# Patient Record
Sex: MALE | Race: WHITE | URBAN - METROPOLITAN AREA
[De-identification: names, ages, dates, MRNs, and addresses within clinical notes are randomized per-mention and may not be internally consistent; named-entity substitution may affect disease eponyms.]

---

## 2017-12-02 PROBLEM — I44.2 COMPLETE HEART BLOCK (HCC): Status: ACTIVE | Noted: 2017-12-02

## 2017-12-02 PROBLEM — I45.9 STOKES-ADAMS SYNCOPE: Status: ACTIVE | Noted: 2017-12-02

## 2018-02-07 ENCOUNTER — TELEPHONE (OUTPATIENT)
Dept: CARDIOLOGY CLINIC | Age: 83
End: 2018-02-07

## 2018-02-28 ENCOUNTER — TELEPHONE (OUTPATIENT)
Dept: CARDIOLOGY CLINIC | Age: 83
End: 2018-02-28

## 2018-04-16 ENCOUNTER — TELEPHONE (OUTPATIENT)
Dept: CARDIOLOGY CLINIC | Age: 83
End: 2018-04-16

## 2018-04-30 ENCOUNTER — TELEPHONE (OUTPATIENT)
Dept: CARDIOLOGY CLINIC | Age: 83
End: 2018-04-30

## 2018-08-06 ENCOUNTER — TELEPHONE (OUTPATIENT)
Dept: CARDIOLOGY CLINIC | Age: 83
End: 2018-08-06

## 2018-11-12 ENCOUNTER — PROCEDURE VISIT (OUTPATIENT)
Dept: CARDIOLOGY CLINIC | Age: 83
End: 2018-11-12
Payer: MEDICARE

## 2018-11-12 ENCOUNTER — TELEPHONE (OUTPATIENT)
Dept: CARDIOLOGY CLINIC | Age: 83
End: 2018-11-12

## 2018-11-12 DIAGNOSIS — I49.5 SINUS NODE DYSFUNCTION (HCC): ICD-10-CM

## 2018-11-12 DIAGNOSIS — Z95.0 CARDIAC PACEMAKER IN SITU: Primary | ICD-10-CM

## 2018-11-12 PROCEDURE — 93296 REM INTERROG EVL PM/IDS: CPT | Performed by: INTERNAL MEDICINE

## 2018-11-12 PROCEDURE — 93294 REM INTERROG EVL PM/LDLS PM: CPT | Performed by: INTERNAL MEDICINE

## 2018-11-16 ENCOUNTER — TELEPHONE (OUTPATIENT)
Dept: CARDIOLOGY CLINIC | Age: 83
End: 2018-11-16

## 2018-11-16 NOTE — TELEPHONE ENCOUNTER
Left message for patient to call and discuss last pacemaker report. Patient needs appointment to discuss anticoagulation therapy.

## 2018-11-20 ENCOUNTER — TELEPHONE (OUTPATIENT)
Dept: CARDIOLOGY CLINIC | Age: 83
End: 2018-11-20

## 2018-11-20 ENCOUNTER — OFFICE VISIT (OUTPATIENT)
Dept: CARDIOLOGY CLINIC | Age: 83
End: 2018-11-20
Payer: MEDICARE

## 2018-11-20 VITALS
DIASTOLIC BLOOD PRESSURE: 90 MMHG | BODY MASS INDEX: 26.58 KG/M2 | HEART RATE: 80 BPM | HEIGHT: 63 IN | WEIGHT: 150 LBS | SYSTOLIC BLOOD PRESSURE: 156 MMHG

## 2018-11-20 DIAGNOSIS — I44.2 COMPLETE HEART BLOCK (HCC): ICD-10-CM

## 2018-11-20 DIAGNOSIS — R00.1 BRADYCARDIA: Primary | ICD-10-CM

## 2018-11-20 DIAGNOSIS — R79.89 SERUM CREATININE RAISED: ICD-10-CM

## 2018-11-20 DIAGNOSIS — I10 ESSENTIAL HYPERTENSION: ICD-10-CM

## 2018-11-20 DIAGNOSIS — E87.20 LACTIC ACID ACIDOSIS: ICD-10-CM

## 2018-11-20 DIAGNOSIS — I45.9 STOKES-ADAMS SYNCOPE: ICD-10-CM

## 2018-11-20 PROCEDURE — G8484 FLU IMMUNIZE NO ADMIN: HCPCS | Performed by: INTERNAL MEDICINE

## 2018-11-20 PROCEDURE — 99213 OFFICE O/P EST LOW 20 MIN: CPT | Performed by: INTERNAL MEDICINE

## 2018-11-20 PROCEDURE — 1101F PT FALLS ASSESS-DOCD LE1/YR: CPT | Performed by: INTERNAL MEDICINE

## 2018-11-20 PROCEDURE — 4040F PNEUMOC VAC/ADMIN/RCVD: CPT | Performed by: INTERNAL MEDICINE

## 2018-11-20 PROCEDURE — 1123F ACP DISCUSS/DSCN MKR DOCD: CPT | Performed by: INTERNAL MEDICINE

## 2018-11-20 PROCEDURE — G8419 CALC BMI OUT NRM PARAM NOF/U: HCPCS | Performed by: INTERNAL MEDICINE

## 2018-11-20 PROCEDURE — 1036F TOBACCO NON-USER: CPT | Performed by: INTERNAL MEDICINE

## 2018-11-20 PROCEDURE — G8427 DOCREV CUR MEDS BY ELIG CLIN: HCPCS | Performed by: INTERNAL MEDICINE

## 2019-02-18 ENCOUNTER — PROCEDURE VISIT (OUTPATIENT)
Dept: CARDIOLOGY CLINIC | Age: 84
End: 2019-02-18
Payer: MEDICARE

## 2019-02-18 DIAGNOSIS — Z95.0 CARDIAC PACEMAKER IN SITU: Primary | ICD-10-CM

## 2019-02-18 DIAGNOSIS — I49.5 SINUS NODE DYSFUNCTION (HCC): ICD-10-CM

## 2019-02-18 PROCEDURE — 93294 REM INTERROG EVL PM/LDLS PM: CPT | Performed by: INTERNAL MEDICINE

## 2019-02-18 PROCEDURE — 93296 REM INTERROG EVL PM/IDS: CPT | Performed by: INTERNAL MEDICINE

## 2019-05-16 ENCOUNTER — OFFICE VISIT (OUTPATIENT)
Dept: CARDIOLOGY CLINIC | Age: 84
End: 2019-05-16
Payer: MEDICARE

## 2019-05-16 VITALS
SYSTOLIC BLOOD PRESSURE: 132 MMHG | WEIGHT: 150 LBS | HEART RATE: 60 BPM | BODY MASS INDEX: 25.61 KG/M2 | HEIGHT: 64 IN | DIASTOLIC BLOOD PRESSURE: 80 MMHG

## 2019-05-16 DIAGNOSIS — Z95.0 PACEMAKER: ICD-10-CM

## 2019-05-16 DIAGNOSIS — I38 VHD (VALVULAR HEART DISEASE): ICD-10-CM

## 2019-05-16 DIAGNOSIS — Z95.2 S/P TAVR (TRANSCATHETER AORTIC VALVE REPLACEMENT): ICD-10-CM

## 2019-05-16 DIAGNOSIS — I10 ESSENTIAL HYPERTENSION: Primary | ICD-10-CM

## 2019-05-16 DIAGNOSIS — I44.2 COMPLETE HEART BLOCK (HCC): ICD-10-CM

## 2019-05-16 DIAGNOSIS — I45.9 STOKES-ADAMS SYNCOPE: ICD-10-CM

## 2019-05-16 DIAGNOSIS — R00.1 BRADYCARDIA: ICD-10-CM

## 2019-05-16 DIAGNOSIS — R55 SYNCOPE AND COLLAPSE: ICD-10-CM

## 2019-05-16 PROCEDURE — 4040F PNEUMOC VAC/ADMIN/RCVD: CPT | Performed by: INTERNAL MEDICINE

## 2019-05-16 PROCEDURE — 99214 OFFICE O/P EST MOD 30 MIN: CPT | Performed by: INTERNAL MEDICINE

## 2019-05-16 PROCEDURE — 1036F TOBACCO NON-USER: CPT | Performed by: INTERNAL MEDICINE

## 2019-05-16 PROCEDURE — G8419 CALC BMI OUT NRM PARAM NOF/U: HCPCS | Performed by: INTERNAL MEDICINE

## 2019-05-16 PROCEDURE — G8427 DOCREV CUR MEDS BY ELIG CLIN: HCPCS | Performed by: INTERNAL MEDICINE

## 2019-05-16 PROCEDURE — 1123F ACP DISCUSS/DSCN MKR DOCD: CPT | Performed by: INTERNAL MEDICINE

## 2019-05-16 RX ORDER — ATORVASTATIN CALCIUM 80 MG/1
80 TABLET, FILM COATED ORAL DAILY
COMMUNITY
End: 2019-05-20 | Stop reason: SDUPTHER

## 2019-05-16 RX ORDER — CLOPIDOGREL BISULFATE 75 MG/1
75 TABLET ORAL DAILY
COMMUNITY
End: 2019-05-20 | Stop reason: SDUPTHER

## 2019-05-16 NOTE — PROGRESS NOTES
Amos Ludwig is a 80 y.o. male who has    CHIEF COMPLAINT AS FOLLOWS:  CHEST PAIN: Patient denies any C/O chest pains at this time. SOB: No C/O SOB at this time. LEG EDEMA: No leg edema   PALPITATIONS: Denies any C/O Palpitations                            DIZZINESS: No C/O Dizziness                         SYNCOPE: None   OTHER: Patient has not been taking any of the medications                                    HPI: Patient is here for F/U on his, VHD, CHB  & HTN  problems. He does not have any complaints at this time. Ruthie Reilly has the following history recorded in care path:  Patient Active Problem List    Diagnosis Date Noted    Essential hypertension      Priority: High    Complete heart block ([address] 12/02/2017     Priority: High    Good-Beard syncope 12/02/2017     Priority: High    Syncope and collapse      Priority: High    Bradycardia      Priority: High    Lactic acid acidosis      Priority: High    Serum creatinine raised      Priority: High    Troponin level elevated      Priority: High    Pacemaker 05/16/2019    VHD (valvular heart disease) 05/16/2019    S/P TAVR (transcatheter aortic valve replacement) 05/16/2019     No current outpatient medications on file. No current facility-administered medications for this visit. Allergies: Patient has no known allergies. Past Medical History:   Diagnosis Date    Hypertension     Thyroid disease      Past Surgical History:   Procedure Laterality Date    APPENDECTOMY      CARDIAC VALVE REPLACEMENT      EYE SURGERY      cataracts      As reviewed History reviewed. No pertinent family history.   Social History     Tobacco Use    Smoking status: Never Smoker    Smokeless tobacco: Never Used   Substance Use Topics    Alcohol use: Yes     Comment: occ      Review of Systems:    Constitutional: Negative for diaphoresis and fatigue  Psychological:Negative for anxiety or depression  HENT: Negative for headaches, nasal congestion, sinus pain or vertigo  Eyes: Negative for visual disturbance. Endocrine: Negative for polydipsia/polyuria  Respiratory: Negative for shortness of breath  Cardiovascular: Negative for chest pain, dyspnea on exertion, claudication, edema, irregular heartbeat, murmur, palpitations or shortness of breath  Gastrointestinal: Negative for abdominal pain or heartburn  Genito-Urinary: Negative for urinary frequency/urgency  Musculoskeletal: Negative for muscle pain, muscular weakness, negative for pain in arm and leg or swelling in foot and leg  Neurological: Negative for dizziness, headaches, memory loss, numbness/tingling, visual changes, syncope  Dermatological: Negative for rash    Objective:  /80   Pulse 60   Ht 5' 4\" (1.626 m)   Wt 150 lb (68 kg)   BMI 25.75 kg/m²   Wt Readings from Last 3 Encounters:   05/16/19 150 lb (68 kg)   11/20/18 150 lb (68 kg)   02/22/18 151 lb (68.5 kg)     Body mass index is 25.75 kg/m². GENERAL - Alert, oriented, pleasant, in no apparent distress. EYES: No jaundice, no conjunctival pallor. SKIN: It is warm & dry. No rashes. No Echhymosis    HEENT - No clinically significant abnormalities seen. Neck - Supple. No jugular venous distention noted. No carotid bruits. Cardiovascular - Normal S1 and S2 without obvious murmur or gallop. Extremities - No cyanosis, clubbing, or significant edema. Pulmonary - No respiratory distress. No wheezes or rales. Abdomen - No masses, tenderness, or organomegaly. Musculoskeletal - No significant edema. No joint deformities. No muscle wasting. Neurologic - Cranial nerves II through XII are grossly intact. There were no gross focal neurologic abnormalities.     Lab Review   Lab Results   Component Value Date    CKTOTAL 241 12/02/2017    TROPONINT 0.010 12/02/2017     BNP:  No results found for: BNP  PT/INR:    Lab Results Component Value Date    INR 0.97 12/02/2017     No results found for: LABA1C  Lab Results   Component Value Date    WBC 11.5 (H) 12/02/2017    HCT 31.7 (L) 12/02/2017    MCV 93.2 12/02/2017     12/02/2017     No results found for: CHOL, TRIG, HDL, LDLCALC, LDLDIRECT, CHOLHDLRATIO  Lab Results   Component Value Date    ALT 21 12/02/2017    AST 35 12/02/2017     BMP:    Lab Results   Component Value Date     12/02/2017    K 4.0 12/02/2017     12/02/2017    CO2 18 12/02/2017    BUN 39 12/02/2017    CREATININE 2.0 12/02/2017     CMP:   Lab Results   Component Value Date     12/02/2017    K 4.0 12/02/2017     12/02/2017    CO2 18 12/02/2017    BUN 39 12/02/2017    PROT 6.9 12/02/2017     TSH:    Lab Results   Component Value Date    TSHHS 3.850 12/02/2017       QUALITY MEASURES REVIEWED:  1.CAD:Patient is taking anti platelet agent:No  2. DYSLIPIDEMIA: Patient is on cholesterol lowering medication:No  3. Beta-Blocker therapy for CAD, if prior Myocardial Infarction:No  4. Atrial fibrillation & anticoagulation therapy No      Impression:    1. Essential hypertension    2. Complete heart block (HCC)    3. Good-Beard syncope    4. Syncope and collapse    5. Bradycardia    6. Pacemaker    7. VHD (valvular heart disease)    8. S/P TAVR (transcatheter aortic valve replacement)       Patient Active Problem List   Diagnosis Code    Complete heart block (HCC) I44.2    Good-Beard syncope I45.9    Syncope and collapse R55    Bradycardia R00.1    Lactic acid acidosis E87.2    Serum creatinine raised R79.89    Troponin level elevated R74.8    Essential hypertension I10    Pacemaker Z95.0    VHD (valvular heart disease) I38    S/P TAVR (transcatheter aortic valve replacement) Z95.2       Assessment & Plan:    CAD:Yes had PCI before [facility]AVR [facility]  HTN:well controlled  With out the medications.               - changes in  treatment:   no   CARDIOMYOPATHY: None known   CONGESTIVE HEART FAILURE: NO KNOWN HISTORY.     VHD: S/P TAVR  DYSLIPIDEMIA: Patient's profile is not available. OTHER RELEVANT DIAGNOSIS:as noted in patient's active problem list:  TESTS ORDERED:  Echocardiogram.                                      All previously ordered tests reviewed. ARRHYTHMIAS: CHB                                 Patient has a pacemaker   MEDICATIONS: As far as heart is concerned patient to take ASA, Plavix & Lipitor only. Office f/u in six months. Device check per protocol. Primary/secondary prevention is the goal by aggressive risk modification, healthy and therapeutic life style changes for cardiovascular risk reduction. Various goals are discussed and multiple questions answered.

## 2019-05-23 RX ORDER — CLOPIDOGREL BISULFATE 75 MG/1
75 TABLET ORAL DAILY
Qty: 90 TABLET | Refills: 3 | Status: SHIPPED | OUTPATIENT
Start: 2019-05-23 | End: 2019-06-25

## 2019-05-23 RX ORDER — ATORVASTATIN CALCIUM 80 MG/1
80 TABLET, FILM COATED ORAL DAILY
Qty: 90 TABLET | Refills: 3 | Status: SHIPPED | OUTPATIENT
Start: 2019-05-23

## 2019-05-28 ENCOUNTER — TELEPHONE (OUTPATIENT)
Dept: CARDIOLOGY CLINIC | Age: 84
End: 2019-05-28

## 2019-05-29 PROCEDURE — 93294 REM INTERROG EVL PM/LDLS PM: CPT | Performed by: INTERNAL MEDICINE

## 2019-05-29 PROCEDURE — 93296 REM INTERROG EVL PM/IDS: CPT | Performed by: INTERNAL MEDICINE

## 2019-05-30 ENCOUNTER — PROCEDURE VISIT (OUTPATIENT)
Dept: CARDIOLOGY CLINIC | Age: 84
End: 2019-05-30
Payer: MEDICARE

## 2019-05-30 DIAGNOSIS — I49.5 SINUS NODE DYSFUNCTION (HCC): ICD-10-CM

## 2019-05-30 DIAGNOSIS — Z95.0 CARDIAC PACEMAKER IN SITU: Primary | ICD-10-CM

## 2019-06-17 ENCOUNTER — TELEPHONE (OUTPATIENT)
Dept: CARDIOLOGY CLINIC | Age: 84
End: 2019-06-17

## 2019-06-17 NOTE — TELEPHONE ENCOUNTER
Called pt back and left a message to call office.  Pt needs appt with Dr. Su Velazquez next Tuesday 06/25/2019 to discuss anticoagulation because afib showed on his monitor

## 2019-06-25 ENCOUNTER — OFFICE VISIT (OUTPATIENT)
Dept: CARDIOLOGY CLINIC | Age: 84
End: 2019-06-25
Payer: MEDICARE

## 2019-06-25 VITALS
DIASTOLIC BLOOD PRESSURE: 70 MMHG | HEIGHT: 63 IN | HEART RATE: 60 BPM | SYSTOLIC BLOOD PRESSURE: 124 MMHG | WEIGHT: 146.2 LBS | BODY MASS INDEX: 25.91 KG/M2

## 2019-06-25 DIAGNOSIS — Z95.2 S/P TAVR (TRANSCATHETER AORTIC VALVE REPLACEMENT): ICD-10-CM

## 2019-06-25 DIAGNOSIS — Z95.0 PACEMAKER: ICD-10-CM

## 2019-06-25 DIAGNOSIS — I45.9 STOKES-ADAMS SYNCOPE: ICD-10-CM

## 2019-06-25 DIAGNOSIS — I48.0 PAF (PAROXYSMAL ATRIAL FIBRILLATION) (HCC): Primary | ICD-10-CM

## 2019-06-25 DIAGNOSIS — I10 ESSENTIAL HYPERTENSION: ICD-10-CM

## 2019-06-25 DIAGNOSIS — I38 VHD (VALVULAR HEART DISEASE): ICD-10-CM

## 2019-06-25 DIAGNOSIS — I44.2 COMPLETE HEART BLOCK (HCC): ICD-10-CM

## 2019-06-25 PROCEDURE — 1036F TOBACCO NON-USER: CPT | Performed by: INTERNAL MEDICINE

## 2019-06-25 PROCEDURE — G8427 DOCREV CUR MEDS BY ELIG CLIN: HCPCS | Performed by: INTERNAL MEDICINE

## 2019-06-25 PROCEDURE — 1123F ACP DISCUSS/DSCN MKR DOCD: CPT | Performed by: INTERNAL MEDICINE

## 2019-06-25 PROCEDURE — 99213 OFFICE O/P EST LOW 20 MIN: CPT | Performed by: INTERNAL MEDICINE

## 2019-06-25 PROCEDURE — 4040F PNEUMOC VAC/ADMIN/RCVD: CPT | Performed by: INTERNAL MEDICINE

## 2019-06-25 PROCEDURE — G8419 CALC BMI OUT NRM PARAM NOF/U: HCPCS | Performed by: INTERNAL MEDICINE

## 2019-06-25 NOTE — PROGRESS NOTES
Mis Arauz is a 80 y.o. male who has    CHIEF COMPLAINT AS FOLLOWS:  CHEST PAIN: Patient denies any C/O chest pains at this time. SOB: No C/O SOB at this time. LEG EDEMA: No leg edema   PALPITATIONS: Denies any C/O Palpitations                                 DIZZINESS: No C/O Dizziness                           SYNCOPE: None   OTHER:                                     HPI: Patient is here for F/U on his [AF, CAD, HTN & Dyslipidemia problems. He does not have any complaints at this time. Edita Patino has the following history recorded in care path:  Patient Active Problem List    Diagnosis Date Noted    Essential hypertension      Priority: High    Complete heart block ([facility] [address] 12/02/2017     Priority: High    Good-Beard syncope 12/02/2017     Priority: High    Syncope and collapse      Priority: High    Bradycardia      Priority: High    Lactic acid acidosis      Priority: High    Serum creatinine raised      Priority: High    Troponin level elevated      Priority: High    PAF (paroxysmal atrial fibrillation) ([facility] [address] 06/25/2019    Pacemaker 05/16/2019    VHD (valvular heart disease) 05/16/2019    S/P TAVR (transcatheter aortic valve replacement) 05/16/2019     Current Outpatient Medications   Medication Sig Dispense Refill    aspirin 81 MG tablet Take 1 tablet by mouth daily 30 tablet 5    atorvastatin (LIPITOR) 80 MG tablet Take 1 tablet by mouth daily 90 tablet 3    clopidogrel (PLAVIX) 75 MG tablet Take 1 tablet by mouth daily 90 tablet 3     No current facility-administered medications for this visit. Allergies: Patient has no known allergies. Past Medical History:   Diagnosis Date    Hypertension     Thyroid disease      Past Surgical History:   Procedure Laterality Date    APPENDECTOMY      CARDIAC VALVE REPLACEMENT      EYE SURGERY      cataracts      As reviewed History reviewed.  No pertinent family history. Social History     Tobacco Use    Smoking status: Never Smoker    Smokeless tobacco: Never Used   Substance Use Topics    Alcohol use: Yes     Comment: occ      Review of Systems:    Constitutional: Negative for diaphoresis and fatigue  Psychological:Negative for anxiety or depression  HENT: Negative for headaches, nasal congestion, sinus pain or vertigo  Eyes: Negative for visual disturbance. Endocrine: Negative for polydipsia/polyuria  Respiratory: Negative for shortness of breath  Cardiovascular: Negative for chest pain, dyspnea on exertion, claudication, edema, irregular heartbeat, murmur, palpitations or shortness of breath  Gastrointestinal: Negative for abdominal pain or heartburn  Genito-Urinary: Negative for urinary frequency/urgency  Musculoskeletal: Negative for muscle pain, muscular weakness, negative for pain in arm and leg or swelling in foot and leg  Neurological: Negative for dizziness, headaches, memory loss, numbness/tingling, visual changes, syncope  Dermatological: Negative for rash    Objective:  /70   Pulse 60   Ht 5' 3\" (1.6 m)   Wt 146 lb 3.2 oz (66.3 kg)   BMI 25.90 kg/m²   Wt Readings from Last 3 Encounters:   06/25/19 146 lb 3.2 oz (66.3 kg)   05/16/19 150 lb (68 kg)   11/20/18 150 lb (68 kg)     Body mass index is 25.9 kg/m². GENERAL - Alert, oriented, pleasant, in no apparent distress. EYES: No jaundice, no conjunctival pallor. SKIN: It is warm & dry. No rashes. No Echhymosis    HEENT - No clinically significant abnormalities seen. Neck - Supple. No jugular venous distention noted. No carotid bruits. Cardiovascular - Normal S1 and S2 without obvious murmur or gallop. Extremities - No cyanosis, clubbing, or significant edema. Pulmonary - No respiratory distress. No wheezes or rales. Abdomen - No masses, tenderness, or organomegaly. Musculoskeletal - No significant edema. No joint deformities. No muscle wasting.   Neurologic - Cranial nerves II through XII are grossly intact. There were no gross focal neurologic abnormalities. Lab Review   Lab Results   Component Value Date    CKTOTAL 241 12/02/2017    TROPONINT 0.010 12/02/2017     BNP:  No results found for: BNP  PT/INR:    Lab Results   Component Value Date    INR 0.97 12/02/2017     No results found for: LABA1C  Lab Results   Component Value Date    WBC 11.5 (H) 12/02/2017    HCT 31.7 (L) 12/02/2017    MCV 93.2 12/02/2017     12/02/2017     No results found for: CHOL, TRIG, HDL, LDLCALC, LDLDIRECT, CHOLHDLRATIO  Lab Results   Component Value Date    ALT 21 12/02/2017    AST 35 12/02/2017     BMP:    Lab Results   Component Value Date     12/02/2017    K 4.0 12/02/2017     12/02/2017    CO2 18 12/02/2017    BUN 39 12/02/2017    CREATININE 2.0 12/02/2017     CMP:   Lab Results   Component Value Date     12/02/2017    K 4.0 12/02/2017     12/02/2017    CO2 18 12/02/2017    BUN 39 12/02/2017    PROT 6.9 12/02/2017     TSH:    Lab Results   Component Value Date    TSHHS 3.850 12/02/2017       QUALITY MEASURES REVIEWED:  1.CAD:Patient is taking anti platelet agent:Yes  2. DYSLIPIDEMIA: Patient is on cholesterol lowering medication:Yes  3. Beta-Blocker therapy for CAD, if prior Myocardial Infarction:No  4. Atrial fibrillation & anticoagulation therapy No    Impression:    1. PAF ([address]oxysmal atrial fibrillation) ([facility] [address]    2. Complete heart block (HCC)    3. Good-Beard syncope    4. Essential hypertension    5. VHD (valvular heart disease)    6. S/P TAVR (transcatheter aortic valve replacement)    7.  Pacemaker       Patient Active Problem List   Diagnosis Code    Complete heart block (HCC) I44.2    Good-Beard syncope I45.9    Syncope and collapse R55    Bradycardia R00.1    Lactic acid acidosis E87.2    Serum creatinine raised R79.89    Troponin level elevated R74.8    Essential hypertension I10    Pacemaker Z95.0    VHD (valvular heart disease)

## 2019-06-25 NOTE — PATIENT INSTRUCTIONS
CAD:No  HTN:well controlled on current medical regimen, see list above. - changes in  treatment:   no   CARDIOMYOPATHY: None known   CONGESTIVE HEART FAILURE: NO KNOWN HISTORY.     VHD: S/P TAVR  OTHER RELEVANT DIAGNOSIS:as noted in patient's active problem list:  TESTS ORDERED: None this visit                                      All previously ordered tests reviewed. ARRHYTHMIAS:  Known. PAF noted on Pacer check. H/o chb                                Discussed with patient. Wants to take blood thinner. Will stop ASA/ Plavix & give Eliquis 2.5 mg BID   MEDICATIONS: CPM   Office f/u in THREE months. Device check per protocol. Primary/secondary prevention is the goal by aggressive risk modification, healthy and therapeutic life style changes for cardiovascular risk reduction. Various goals are discussed and multiple questions answered.

## 2019-06-25 NOTE — PROGRESS NOTES
[id number] Score for Atrial Fibrillation Stroke Risk   Risk   Factors  Component Value   C CHF No 0   H HTN Yes 1   A2 Age >= 76 Yes,  (80 y.o.) 2   D DM No 0   S2 Prior Stroke/TIA No 0   V Vascular Disease No 0   A Age 74-69 No,  (80 y.o.) 0   Sc Sex male 0    [id number]  Score  3   Score last updated 5/15/30 5:27 PM    Click here for a link to the UpToDate guideline \"Atrial Fibrillation: Anticoagulation therapy to prevent embolization    Disclaimer: Risk Score calculation is dependent on accuracy of patient problem list and past encounter diagnosis.

## 2019-09-12 ENCOUNTER — TELEPHONE (OUTPATIENT)
Dept: CARDIOLOGY CLINIC | Age: 84
End: 2019-09-12

## 2019-09-24 ENCOUNTER — TELEPHONE (OUTPATIENT)
Dept: CARDIOLOGY CLINIC | Age: 84
End: 2019-09-24

## 2019-12-12 ENCOUNTER — TELEPHONE (OUTPATIENT)
Dept: CARDIOLOGY CLINIC | Age: 84
End: 2019-12-12

## 2021-05-21 ENCOUNTER — OFFICE VISIT (OUTPATIENT)
Dept: CARDIOLOGY CLINIC | Age: 86
End: 2021-05-21
Payer: MEDICARE

## 2021-05-21 ENCOUNTER — TELEPHONE (OUTPATIENT)
Dept: CARDIOLOGY CLINIC | Age: 86
End: 2021-05-21

## 2021-05-21 VITALS
SYSTOLIC BLOOD PRESSURE: 182 MMHG | WEIGHT: 147 LBS | DIASTOLIC BLOOD PRESSURE: 82 MMHG | BODY MASS INDEX: 26.05 KG/M2 | HEART RATE: 65 BPM | HEIGHT: 63 IN

## 2021-05-21 DIAGNOSIS — Z95.2 S/P TAVR (TRANSCATHETER AORTIC VALVE REPLACEMENT): ICD-10-CM

## 2021-05-21 DIAGNOSIS — I10 ESSENTIAL HYPERTENSION: Primary | ICD-10-CM

## 2021-05-21 DIAGNOSIS — I45.9 STOKES-ADAMS SYNCOPE: ICD-10-CM

## 2021-05-21 DIAGNOSIS — I48.0 PAF (PAROXYSMAL ATRIAL FIBRILLATION) (HCC): ICD-10-CM

## 2021-05-21 DIAGNOSIS — Z91.81 RISK FOR FALLS: ICD-10-CM

## 2021-05-21 DIAGNOSIS — I44.2 COMPLETE HEART BLOCK (HCC): ICD-10-CM

## 2021-05-21 DIAGNOSIS — Z95.0 PACEMAKER: ICD-10-CM

## 2021-05-21 DIAGNOSIS — I38 VHD (VALVULAR HEART DISEASE): ICD-10-CM

## 2021-05-21 PROCEDURE — 4040F PNEUMOC VAC/ADMIN/RCVD: CPT | Performed by: INTERNAL MEDICINE

## 2021-05-21 PROCEDURE — G8417 CALC BMI ABV UP PARAM F/U: HCPCS | Performed by: INTERNAL MEDICINE

## 2021-05-21 PROCEDURE — 99214 OFFICE O/P EST MOD 30 MIN: CPT | Performed by: INTERNAL MEDICINE

## 2021-05-21 PROCEDURE — 1123F ACP DISCUSS/DSCN MKR DOCD: CPT | Performed by: INTERNAL MEDICINE

## 2021-05-21 PROCEDURE — 1036F TOBACCO NON-USER: CPT | Performed by: INTERNAL MEDICINE

## 2021-05-21 PROCEDURE — G8427 DOCREV CUR MEDS BY ELIG CLIN: HCPCS | Performed by: INTERNAL MEDICINE

## 2021-05-21 PROCEDURE — 93280 PM DEVICE PROGR EVAL DUAL: CPT | Performed by: INTERNAL MEDICINE

## 2021-05-21 RX ORDER — AMLODIPINE BESYLATE 10 MG/1
10 TABLET ORAL DAILY
Qty: 90 TABLET | Refills: 3 | Status: SHIPPED | OUTPATIENT
Start: 2021-05-21

## 2021-05-21 RX ORDER — LISINOPRIL 10 MG/1
10 TABLET ORAL DAILY
COMMUNITY
Start: 2021-05-20 | End: 2021-05-21

## 2021-05-21 RX ORDER — ASPIRIN 81 MG/1
81 TABLET ORAL DAILY
COMMUNITY
End: 2021-05-21

## 2021-05-21 RX ORDER — ASPIRIN 325 MG
325 TABLET, DELAYED RELEASE (ENTERIC COATED) ORAL DAILY
Qty: 90 TABLET | Refills: 3 | Status: SHIPPED | OUTPATIENT
Start: 2021-05-21

## 2021-05-21 NOTE — PROGRESS NOTES
OFFICE Cassandra Muller is a 80 y.o. male who has    CHIEF COMPLAINT AS FOLLOWS:  CHEST PAIN: Patient denies any C/O chest pains at this time. SOB: No C/O SOB at this time. LEG EDEMA: No leg edema   PALPITATIONS: Denies any C/O Palpitations                                 DIZZINESS: No C/O Dizziness                           SYNCOPE: None   OTHER:                                     HPI: Patient is here for F/U on his VHD, PAF- Arrhythmia, HTN & Dyslipidemia problems. VHD: Patient has known Hx of  aortic valve disease. Has had TAVR in the past.  Arrhythmia: Patient has known Hx of PAF. HTN: Patient has known Hx of essential HTN. Has been treated with guideline recommended medical / physical/ diet therapy as stated below. Dyslipidemia: Patient has known Hx of mixed dyslipidemia. Has been treated with guideline recommended medical / physical/ diet therapy as stated below. He does not have any new complaints at this time. Current Outpatient Medications   Medication Sig Dispense Refill    lisinopril (PRINIVIL;ZESTRIL) 10 MG tablet Take 10 mg by mouth daily      aspirin 81 MG EC tablet Take 81 mg by mouth daily      atorvastatin (LIPITOR) 80 MG tablet Take 1 tablet by mouth daily 90 tablet 3    apixaban (ELIQUIS) 2.5 MG TABS tablet Take 1 tablet by mouth 2 times daily (Patient not taking: Reported on 5/21/2021) 180 tablet 1     No current facility-administered medications for this visit. Allergies: Patient has no known allergies.   Review of Systems:    Constitutional: Negative for diaphoresis and fatigue  Respiratory: Negative for shortness of breath  Cardiovascular: Negative for chest pain, dyspnea on exertion, claudication, edema, irregular heartbeat, murmur, palpitations or shortness of breath  Musculoskeletal: Negative for muscle pain, muscular weakness, negative for pain in arm and leg or swelling in foot and leg    Objective:  BP (!) 182/82   Pulse 65   Ht daily. Change ASA to 325 mg EC. No Eliquis due to fall risk. Discussed with patient's daughter. BP check with nurse in two weeks. Office f/u in six months. Device check per protocol.

## 2021-05-21 NOTE — PATIENT INSTRUCTIONS
Please be informed that if you contact our office outside of normal business hours the physician on call cannot help with any scheduling or rescheduling issues, procedure instruction questions or any type of medication issue. We advise you for any urgent/emergency that you go to the nearest emergency room! PLEASE CALL OUR OFFICE DURING NORMAL BUSINESS HOURS    Monday - Friday   8 am to 5 pm    [place]: [address] [address]: [phone]    [place]:  [phone]  **It is YOUR responsibilty to bring medication bottles and/or updated medication list to [address] [place] [address]. This will allow us to better serve you and all your healthcare needs**    CAD:None known  HTN: Not well controlled on current medical regimen, see list above. - changes in  treatment: Yes   CARDIOMYOPATHY: None known   CONGESTIVE HEART FAILURE: NO KNOWN HISTORY.     VHD: S/P TAVR  OTHER RELEVANT DIAGNOSIS:as noted in patient's active problem list:  TESTS ORDERED: None this visit                                      All previously ordered tests reviewed. ARRHYTHMIAS:  Known. PAF noted on Pacer check. H/o CHB                                Discussed with patient. He says he is wobbly on his feet. Not taking Eliquis. MEDICATIONS: Increase Amlodipine to 10 mg daily. Change ASA to 325 mg EC. No Eliquis due to fall risk. Discussed with patient's daughter. BP check with nurse in two weeks. Office f/u in six months. Device check per protocol.

## 2021-05-21 NOTE — LETTER
[address]  [address] [address]  Phone: [phone]  Fax: [phone]    Rosana Cordova MD    May 21, 2021     Lili Thomas  [facility] [id number]  [place] [id number]    Patient: Ira Hernandez   MR Number: [id number]   YOB: 1923   Date of Visit: 5/21/2021       Dear Lili Thomas: Thank you for referring Mesfin Genao to me for evaluation/treatment. Below are the relevant portions of my assessment and plan of care. If you have questions, please do not hesitate to call me. I look forward to following Tonja Hernandez with you.     Sincerely,        Rosana Cordova MD

## 2021-05-21 NOTE — LETTER
Amita Suarez  4/2/1923  [id number]    Have you had any Chest Pain that is not new? - No     DO EKG IF: Patient has a Heart Rate above 100 or below 40     CAD (Coronary Artery Disease) patient should have one on file every 6 months        Have you had any Shortness of Breath - No    Have you had any dizziness - No       Sitting wait 5 minutes do supine (laying down) wait 5 minutes then do standing - log each in \"vitals\" area in Epic   Be sure to ask what symptoms they are having if they get dizzy while completing ortho stats such as: room spinning, nausea, etc.    Have you had any palpitations that are not new?  - No      Is the patient on any of the following medications -     Do you have any edema - swelling in No      Do you have a surgery or procedure scheduled in the near future - No per pt

## 2021-05-21 NOTE — LETTER
[address]  [address] [address]  Phone: [phone]  Fax: [phone]            May 21, 2021    Soni Ram[address]rez [address]      Dear Jh Ashford: This is your CARELINK schedule. Please marti your calendar with these dates. You can do your checks anytime during the scheduled day. Since we do not do reminder calls, it will be your responsibility to perform the checks on the day it is scheduled. If you have any questions or concerns, please call and ask for Virginia Kelley at [phone].

## 2021-12-09 ENCOUNTER — TELEPHONE (OUTPATIENT)
Dept: CARDIOLOGY CLINIC | Age: 86
End: 2021-12-09

## 2023-06-20 ENCOUNTER — TELEPHONE (OUTPATIENT)
Dept: CARDIOLOGY CLINIC | Age: 88
End: 2023-06-20

## 2023-09-27 ENCOUNTER — TELEPHONE (OUTPATIENT)
Dept: CARDIOLOGY CLINIC | Age: 88
End: 2023-09-27

## 2024-01-15 ENCOUNTER — TELEPHONE (OUTPATIENT)
Dept: CARDIOLOGY CLINIC | Age: 89
End: 2024-01-15

## 2024-01-15 NOTE — TELEPHONE ENCOUNTER
Called patient to ask that he send his carelink transmission and he said that he does not have a pacemaker and we are not on the same page and hung up the phone. I called number for emergency contact and could not leave a message due to mailbox full.

## 2024-04-09 ENCOUNTER — TELEPHONE (OUTPATIENT)
Dept: CARDIOLOGY CLINIC | Age: 89
End: 2024-04-09

## 2024-04-09 NOTE — TELEPHONE ENCOUNTER
Called patient to ask that he send carelink transmission. Patient said that he does not have a pacemaker and does not know what I was talking about and hung up the phone. I looked in his chart and found the number to his daughter Aliyah Tolbert. She is listed on his HIPAA form. I called her and [place]. She did thank me for calling and said that dad is hard of hearing and does not have any patience. She said that he is forgetful and has good days and bad days and she will make some phone calls to see if she can get him in to see the doctor and have his device checked. Aliyah said that it would be his choice if he wanted to come in for a appointment. I told her that I understood that. She thanked me for calling.

## 2024-06-11 ENCOUNTER — OFFICE VISIT (OUTPATIENT)
Dept: CARDIOLOGY CLINIC | Age: 89
End: 2024-06-11
Payer: MEDICARE

## 2024-06-11 ENCOUNTER — NURSE ONLY (OUTPATIENT)
Dept: CARDIOLOGY CLINIC | Age: 89
End: 2024-06-11
Payer: MEDICARE

## 2024-06-11 ENCOUNTER — TELEPHONE (OUTPATIENT)
Dept: CARDIOLOGY CLINIC | Age: 89
End: 2024-06-11

## 2024-06-11 VITALS — SYSTOLIC BLOOD PRESSURE: 158 MMHG | HEART RATE: 66 BPM | DIASTOLIC BLOOD PRESSURE: 86 MMHG

## 2024-06-11 VITALS
HEART RATE: 88 BPM | OXYGEN SATURATION: 98 % | BODY MASS INDEX: 23.32 KG/M2 | SYSTOLIC BLOOD PRESSURE: 138 MMHG | DIASTOLIC BLOOD PRESSURE: 88 MMHG | WEIGHT: 140 LBS | HEIGHT: 65 IN

## 2024-06-11 DIAGNOSIS — I38 VHD (VALVULAR HEART DISEASE): Primary | ICD-10-CM

## 2024-06-11 DIAGNOSIS — I45.9 STOKES-ADAMS SYNCOPE: ICD-10-CM

## 2024-06-11 DIAGNOSIS — Z95.0 CARDIAC PACEMAKER IN SITU: Primary | ICD-10-CM

## 2024-06-11 DIAGNOSIS — Z95.2 S/P TAVR (TRANSCATHETER AORTIC VALVE REPLACEMENT): ICD-10-CM

## 2024-06-11 DIAGNOSIS — I48.0 PAF (PAROXYSMAL ATRIAL FIBRILLATION) (HCC): ICD-10-CM

## 2024-06-11 DIAGNOSIS — Z95.0 PACEMAKER: ICD-10-CM

## 2024-06-11 DIAGNOSIS — I44.2 COMPLETE HEART BLOCK (HCC): ICD-10-CM

## 2024-06-11 DIAGNOSIS — I10 ESSENTIAL HYPERTENSION: ICD-10-CM

## 2024-06-11 PROCEDURE — 93280 PM DEVICE PROGR EVAL DUAL: CPT | Performed by: INTERNAL MEDICINE

## 2024-06-11 PROCEDURE — G8427 DOCREV CUR MEDS BY ELIG CLIN: HCPCS | Performed by: INTERNAL MEDICINE

## 2024-06-11 PROCEDURE — G8420 CALC BMI NORM PARAMETERS: HCPCS | Performed by: INTERNAL MEDICINE

## 2024-06-11 PROCEDURE — 1036F TOBACCO NON-USER: CPT | Performed by: INTERNAL MEDICINE

## 2024-06-11 PROCEDURE — 99213 OFFICE O/P EST LOW 20 MIN: CPT | Performed by: INTERNAL MEDICINE

## 2024-06-11 PROCEDURE — 1123F ACP DISCUSS/DSCN MKR DOCD: CPT | Performed by: INTERNAL MEDICINE

## 2024-06-11 RX ORDER — ASPIRIN 325 MG
325 TABLET ORAL DAILY
Status: SHIPPED | OUTPATIENT
Start: 2024-06-11 | End: 2024-07-11

## 2024-06-11 RX ORDER — AMLODIPINE BESYLATE 5 MG/1
5 TABLET ORAL DAILY
Qty: 30 TABLET | Refills: 5 | Status: SHIPPED | OUTPATIENT
Start: 2024-06-11

## 2024-06-11 NOTE — PROGRESS NOTES
Upper Arm, Position: Sitting, Cuff Size: Medium Adult)   Pulse 88   Ht 1.651 m (5' 5\")   Wt 63.5 kg (140 lb)   SpO2 98%   BMI 23.30 kg/m²   Wt Readings from Last 3 Encounters:   06/11/24 63.5 kg (140 lb)   05/21/21 66.7 kg (147 lb)   06/25/19 66.3 kg (146 lb 3.2 oz)     Body mass index is 23.3 kg/m².  GENERAL - Alert, oriented, pleasant, in no apparent distress.  EYES: No jaundice, no conjunctival pallor.  Neck - Supple.  No jugular venous distention noted. No carotid bruits.   Cardiovascular - Normal S1 and S2 without obvious murmur or gallop.    Extremities - No cyanosis, clubbing, or significant edema.    Pulmonary - No respiratory distress.  No wheezes or rales.      MEDICAL DECISION MAKING & DATA REVIEW:    Lab Review   Lab Results   Component Value Date/Time    TROPONINT 0.010 12/02/2017 08:35 PM     Lab Results   Component Value Date/Time    PROBNP 3,714 12/02/2017 08:35 PM     Lab Results   Component Value Date    INR 0.97 12/02/2017     No results found for: \"LABA1C\"  Lab Results   Component Value Date    WBC 11.5 (H) 12/02/2017    HGB 10.1 (L) 12/02/2017    HCT 31.7 (L) 12/02/2017    MCV 93.2 12/02/2017     12/02/2017     No results found for: \"CHOL\", \"TRIG\", \"HDL\", \"LDLDIRECT\", \"CHOLHDLRATIO\"  Lab Results   Component Value Date    ALT 21 12/02/2017    AST 35 12/02/2017     BMP:    Lab Results   Component Value Date/Time     12/02/2017 08:35 PM    K 4.0 12/02/2017 08:35 PM     12/02/2017 08:35 PM    CO2 18 12/02/2017 08:35 PM    BUN 39 12/02/2017 08:35 PM    CREATININE 2.0 12/02/2017 08:35 PM     CMP:   Lab Results   Component Value Date/Time     12/02/2017 08:35 PM    K 4.0 12/02/2017 08:35 PM     12/02/2017 08:35 PM    CO2 18 12/02/2017 08:35 PM    BUN 39 12/02/2017 08:35 PM    CREATININE 2.0 12/02/2017 08:35 PM     Lab Results   Component Value Date/Time    [facility] 3.850 12/02/2017 08:35 PM       QUALITY MEASURES REVIEWED:  1.CAD:Patient is taking anti platelet

## 2024-06-11 NOTE — TELEPHONE ENCOUNTER
Test Ordered:  Echo    /  Insurance: Medicare  /  Authorization Status: No Auth Required     independent

## 2024-06-11 NOTE — PATIENT INSTRUCTIONS
CORONARY ARTERY DISEASE:None known    HTN: Not well controlled on current medical regimen, see list above.            - changes in  treatment: Yes     CARDIOMYOPATHY: None known   CONGESTIVE HEART FAILURE: NO KNOWN HISTORY.       VHD: S/P TAVR. Last Echo 2017     ARRHYTHMIAS:  Known. PAF noted on Pacer check. H/o CHB                                Discussed with patient. He says he is wobbly on his feet.                                Not taking Eliquis.   Pacer checked today: 2.5 years of battery life. PAF noted.    TESTS ORDERED: Echo     PREVIOUSLY ORDERED TESTS REVIEWED & DISCUSSED WITH THE PATIENT:     I personally reviewed & interpreted, all previously ordered tests as copied above. Latest Labs are pulled in to the note with dates.   Labs, specially in Reference to Lipid profile, Cardiac testing in the form of Echo ( dated: ), stress tests ( dated: ) & other relevant cardiac testing reviewed with patient & recommendations made based on assessment of the results.    Discussed role of Cardiac risk factors & effects + treatment of co morbidities with patient & advised accordingly.     MEDICATIONS: List of medications patient is currently taking is reviewed in detail with the patient & care giver present. Discussed any side effects or problems taking the medication.     Recommend Continue present management & medications as listed.     AFFIRMATION: I spent at least 20 minutes of time reviewing patient's history, previous & current medical problems & all Labs + testing. This includes chart prep even prior to the vosit. Various goals are discussed and multiple questions answered.Relevant concelling performed.     Office follow up in six months. Device check per protocol.

## 2024-06-13 ENCOUNTER — TELEPHONE (OUTPATIENT)
Dept: CARDIOLOGY CLINIC | Age: 89
End: 2024-06-13

## 2024-06-17 ENCOUNTER — TELEPHONE (OUTPATIENT)
Dept: CARDIOLOGY CLINIC | Age: 89
End: 2024-06-17

## 2024-06-18 ENCOUNTER — TELEPHONE (OUTPATIENT)
Dept: CARDIOLOGY CLINIC | Age: 89
End: 2024-06-18

## 2024-06-18 NOTE — TELEPHONE ENCOUNTER
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 I advised her that I was trying to reach her father to schedule apt with Dr Castro per Dr Castro and that when I called I was literally yelling on the phone and that patient stated to me he has no heart problem and \"why are you calling me\". I advised her that patient was seen last week by Dr Madrid and that he discussed with DR Castro patients recent PPM report. Patient is having PAF. Daughter stated that the patients cognition is declining, he lives alone and has no care giver to come in daily to help him with his medications or ADL's. She stated he has expressed to daughter he doesn't want to live any longer. Daughter stated patient does have a DNR from his PCP, she stated she will mail us a copy. She stated he has a hearing impaired phone when he remember to read it. Stated he does not take his medications. She is willing to try to tell her father of the medical treatment that would be needed but no guarantees he will want to do anything. She also stated the other issue would be getting him here for an apt. Advised daughter that Dr Castro is gone for next 2 weeks and that I can discuss with him when he returns and that I would advise Dr Madrid of this conversation. Advised daughter I will call her back once I speak with Dr Castro about any plan of care he was thinking of.   Dr Madrid was advised verbally by this nurse.   Patient daughter with no other questions or concerns at present.

## 2024-06-18 NOTE — TELEPHONE ENCOUNTER
Called patient, patient is VERY hard of hearing. Stated he has no heart problem and was not just seen here and then laid the phone down. I hung up and will try alternative numbers for patient.     Called patients daughter Rachid per HIPAA paper, lm asking her to call back.

## 2024-06-26 ENCOUNTER — TELEPHONE (OUTPATIENT)
Dept: CARDIOLOGY CLINIC | Age: 89
End: 2024-06-26

## 2024-09-18 ENCOUNTER — TELEPHONE (OUTPATIENT)
Dept: CARDIOLOGY CLINIC | Age: 89
End: 2024-09-18

## 2025-01-31 ENCOUNTER — HOSPITAL ENCOUNTER (OUTPATIENT)
Age: 89
Setting detail: SPECIMEN
Discharge: HOME | End: 2025-01-31

## 2025-05-28 ENCOUNTER — TELEPHONE (OUTPATIENT)
Dept: CARDIOLOGY CLINIC | Age: 89
End: 2025-05-28